# Patient Record
Sex: MALE | Race: WHITE | Employment: FULL TIME | URBAN - METROPOLITAN AREA
[De-identification: names, ages, dates, MRNs, and addresses within clinical notes are randomized per-mention and may not be internally consistent; named-entity substitution may affect disease eponyms.]

---

## 2023-10-16 ENCOUNTER — OFFICE VISIT (OUTPATIENT)
Dept: FAMILY MEDICINE | Facility: CLINIC | Age: 48
End: 2023-10-16
Payer: COMMERCIAL

## 2023-10-16 VITALS
TEMPERATURE: 98.7 F | HEART RATE: 91 BPM | BODY MASS INDEX: 37.09 KG/M2 | RESPIRATION RATE: 18 BRPM | OXYGEN SATURATION: 96 % | SYSTOLIC BLOOD PRESSURE: 144 MMHG | WEIGHT: 250.4 LBS | DIASTOLIC BLOOD PRESSURE: 86 MMHG | HEIGHT: 69 IN

## 2023-10-16 DIAGNOSIS — E78.5 HYPERLIPIDEMIA LDL GOAL <100: ICD-10-CM

## 2023-10-16 DIAGNOSIS — Z12.11 SCREEN FOR COLON CANCER: ICD-10-CM

## 2023-10-16 DIAGNOSIS — Z11.59 NEED FOR HEPATITIS C SCREENING TEST: ICD-10-CM

## 2023-10-16 DIAGNOSIS — I10 PRIMARY HYPERTENSION: Primary | ICD-10-CM

## 2023-10-16 DIAGNOSIS — E66.812 CLASS 2 SEVERE OBESITY DUE TO EXCESS CALORIES WITH SERIOUS COMORBIDITY AND BODY MASS INDEX (BMI) OF 37.0 TO 37.9 IN ADULT (H): ICD-10-CM

## 2023-10-16 DIAGNOSIS — E66.01 CLASS 2 SEVERE OBESITY DUE TO EXCESS CALORIES WITH SERIOUS COMORBIDITY AND BODY MASS INDEX (BMI) OF 37.0 TO 37.9 IN ADULT (H): ICD-10-CM

## 2023-10-16 DIAGNOSIS — R74.01 ELEVATED TRANSAMINASE LEVEL: ICD-10-CM

## 2023-10-16 DIAGNOSIS — Z11.4 SCREENING FOR HIV (HUMAN IMMUNODEFICIENCY VIRUS): ICD-10-CM

## 2023-10-16 DIAGNOSIS — N52.9 ERECTILE DYSFUNCTION, UNSPECIFIED ERECTILE DYSFUNCTION TYPE: ICD-10-CM

## 2023-10-16 PROCEDURE — 99204 OFFICE O/P NEW MOD 45 MIN: CPT | Performed by: PHYSICIAN ASSISTANT

## 2023-10-16 RX ORDER — VITAMIN B COMPLEX
1 CAPSULE ORAL DAILY
COMMUNITY

## 2023-10-16 RX ORDER — LOSARTAN POTASSIUM 25 MG/1
25 TABLET ORAL DAILY
Qty: 90 TABLET | Refills: 3 | Status: SHIPPED | OUTPATIENT
Start: 2023-10-16

## 2023-10-16 RX ORDER — CETIRIZINE HYDROCHLORIDE 10 MG/1
10 TABLET ORAL DAILY PRN
COMMUNITY

## 2023-10-16 RX ORDER — TADALAFIL 20 MG/1
20 TABLET ORAL EVERY 24 HOURS
Qty: 90 TABLET | Refills: 3 | Status: SHIPPED | OUTPATIENT
Start: 2023-10-16

## 2023-10-16 ASSESSMENT — PAIN SCALES - GENERAL: PAINLEVEL: NO PAIN (0)

## 2023-10-16 NOTE — PROGRESS NOTES
Assessment & Plan   Primary hypertension  Inadequately controlled. Currently taking tadalafil PRN for concurrent ED. I have never heard of Tadafil used as first line for BP control. There is no indication for this, even with erectile dysfunction.   Previously has tried losartan, irbesartan-hydrochlorothiazide, and hydrochlorothiazide. Given inadequate control and lack of daily medicine, restart losartan. Monitor Bps and send message on [employer] to determine if dose increase warranted. Recheck BMP in 1 month.   - losartan (COZAAR) 25 MG tablet; Take 1 tablet (25 mg) by mouth daily  Dispense: 90 tablet; Refill: 3  - Comprehensive metabolic panel; Future  - Basic metabolic panel; Future    Erectile dysfunction, unspecified erectile dysfunction type  Inadequately controlled hyperlipidemia and hypertension may be contributory factors. Continue tadalafil prn for ED and will check morning testosterone lab and PSA.   - tadalafil (ADCIRCA/CIALIS) 20 MG tablet; Take 1 tablet (20 mg) by mouth every 24 hours  Dispense: 90 tablet; Refill: 3  - Testosterone Free and Total; Future  - PSA, tumor marker; Future    Hyperlipidemia LDL goal <100  Recommended to be on statin but does not want to take statins due to potential side effects. Reviewed risks of uncontrolled hyperlipidemia along with other risk factors. He would be interested in other medicines. Potentially Zetia or a PCSK-9 inhibitor. Will check lipid panel and determine follow-up plan.   - Lipid panel reflex to direct LDL Non-fasting; Future    Class 2 severe obesity due to excess calories with serious comorbidity and body mass index (BMI) of 37.0 to 37.9 in adult (H)  Body mass index is 37.52 kg/m .. Associated with HTN, HLD which would improve with weight loss. Check A1c. Pt requested insulin level despite lack of clinical utility. Encouraged healthy lifestyle changes.   - Hemoglobin A1c; Future  - Insulin level; Future    Elevated transaminase level  Elevated ALT and  "AST last year. Suspect fatty liver disease. Recheck. Consider RUQ US if still elevated.   - Comprehensive metabolic panel; Future    Screening for HIV (human immunodeficiency virus)  Routine one-time HIV lab.   - HIV Antigen Antibody Combo; Future    Need for hepatitis C screening test  Routine one-time hepatitis C lab.   - Hepatitis C Screen Reflex to HCV RNA Quant and Genotype; Future    Screen for colon cancer  Routine colonoscopy screening.   - Colonoscopy Screening  Referral; Future    BMI:   Estimated body mass index is 37.52 kg/m  as calculated from the following:    Height as of this encounter: 1.74 m (5' 8.5\").    Weight as of this encounter: 113.6 kg (250 lb 6.4 oz).   Weight management plan: Discussed healthy diet and exercise guidelines    This patient was seen by KYLIE Plasencia, in collaboration with Sanjeev Hook PA-C.   [facility]    Varsha Morrell is a 48 year old, presenting for the following health issues:  Erectile Dysfunction        10/16/2023    10:09 AM   Additional Questions   Roomed by Sherie LARSON CMA       History of Present Illness       Reason for visit:  Erectile Dysfunction (Says that it started with his blood pressure medications were not working. Says that he had then been put on tadalafil 30mg)    He eats 0-1 servings of fruits and vegetables daily.He consumes 0 sweetened beverage(s) daily.He exercises with enough effort to increase his heart rate 9 or less minutes per day.  He exercises with enough effort to increase his heart rate 3 or less days per week.   He is taking medications regularly.     Review of Systems   Constitutional, HEENT, cardiovascular, pulmonary, gi and gu systems are negative, except as otherwise noted.      Objective    BP (!) 144/86   Pulse 91   Temp 98.7  F (37.1  C) (Tympanic)   Resp 18   Ht 1.74 m (5' 8.5\")   Wt 113.6 kg (250 lb 6.4 oz)   SpO2 96%   BMI 37.52 kg/m    Body mass index is 37.52 " kg/m .  Physical Exam   GENERAL: healthy, alert and no distress  NECK: no adenopathy, no asymmetry, masses, or scars and thyroid normal to palpation  RESP: lungs clear to auscultation - no rales, rhonchi or wheezes  CV: regular rate and rhythm, normal S1 S2, no S3 or S4, no murmur, click or rub, no peripheral edema and peripheral pulses strong  ABDOMEN: soft, nontender, no hepatosplenomegaly, no masses and bowel sounds normal  MS: no gross musculoskeletal defects noted, no edema    Physician Attestation   I, Sanjeev Hook PA-C, was present with the medical/ONEL student who participated in the service and in the documentation of the note.  I have verified the history and personally performed the physical exam and medical decision making.  I agree with the assessment and plan of care as documented in the note.      Sanjeev Hook PA-C

## 2023-10-16 NOTE — PATIENT INSTRUCTIONS
Restart Losartan. Continue Tadalfil.     Lab work first thing in the morning. This is for prevention and to help us know why you have erectile dysfunction.     Schedule colonoscopy.

## 2023-11-21 ENCOUNTER — TELEPHONE (OUTPATIENT)
Dept: FAMILY MEDICINE | Facility: CLINIC | Age: 48
End: 2023-11-21

## 2023-11-21 NOTE — TELEPHONE ENCOUNTER
Patient Quality Outreach    Patient is due for the following:   Hypertension -  BP check    Next Steps:   Schedule a nurse only visit for blood pressure check or see if patient has been checking blood pressure at home.     Type of outreach:    Phone, left message for patient/parent to call back.    Next Steps:  Reach out within 90 days via Phone.    Max number of attempts reached: No. Will try again in 90 days if patient still on fail list.    Questions for provider review:    None           Sherie Arita CMA  Chart routed to Care Team.

## 2024-12-24 DIAGNOSIS — N52.9 ERECTILE DYSFUNCTION, UNSPECIFIED ERECTILE DYSFUNCTION TYPE: ICD-10-CM

## 2024-12-26 RX ORDER — TADALAFIL 20 MG/1
20 TABLET ORAL DAILY
Qty: 90 TABLET | Refills: 0 | Status: SHIPPED | OUTPATIENT
Start: 2024-12-26